# Patient Record
Sex: MALE | Race: WHITE | Employment: FULL TIME | URBAN - METROPOLITAN AREA
[De-identification: names, ages, dates, MRNs, and addresses within clinical notes are randomized per-mention and may not be internally consistent; named-entity substitution may affect disease eponyms.]

---

## 2017-04-11 PROCEDURE — 84153 ASSAY OF PSA TOTAL: CPT | Performed by: FAMILY MEDICINE

## 2017-04-11 PROCEDURE — 88305 TISSUE EXAM BY PATHOLOGIST: CPT | Performed by: FAMILY MEDICINE

## 2018-04-21 PROCEDURE — 82533 TOTAL CORTISOL: CPT | Performed by: FAMILY MEDICINE

## 2018-04-21 PROCEDURE — 84153 ASSAY OF PSA TOTAL: CPT | Performed by: FAMILY MEDICINE

## 2018-04-21 PROCEDURE — 82746 ASSAY OF FOLIC ACID SERUM: CPT | Performed by: FAMILY MEDICINE

## 2018-04-21 PROCEDURE — 82607 VITAMIN B-12: CPT | Performed by: FAMILY MEDICINE

## 2018-04-21 PROCEDURE — 86803 HEPATITIS C AB TEST: CPT | Performed by: FAMILY MEDICINE

## 2018-06-22 PROCEDURE — 88305 TISSUE EXAM BY PATHOLOGIST: CPT | Performed by: INTERNAL MEDICINE

## 2018-11-11 PROBLEM — N52.01 ERECTILE DYSFUNCTION DUE TO ARTERIAL INSUFFICIENCY: Status: ACTIVE | Noted: 2018-11-11

## 2021-04-25 NOTE — ED PROVIDER NOTES
Patient Seen in: THE [facility] In [place]      History   Patient presents with:   Anxiety/Panic attack    Stated Complaint: panic attack    HPI/Subjective:   HPI    63-year-old male who comes in today complaining of a panic attack that oc Latonia Harman MD;  Location: [address] [place] [address]   • [address]  2007    at [facility] through rectum and colostomy - due in 10 years   • LAP, SURG; COLECTOMY, PARTIAL, W/ANASTOMOSIS, W/COLOPROCTOSTOMY, W/COLOSTOMY      eventual reversal   • OTHER SURG and reactive to light. Cardiovascular:      Rate and Rhythm: Normal rate and regular rhythm. Heart sounds: Normal heart sounds. Pulmonary:      Effort: Pulmonary effort is normal. No respiratory distress. Breath sounds: Normal breath sounds. Title    Criteria Score   Age: 41-57 Age Score: 1   History: Slightly Suspicious Hx Score: 0   EKG: Normal EKG Score: 0   HTN: Yes   Hypercholesterolemia: Yes   Atherosclerosis/PVD: No   DM: No   BMI>30kg/m2: Yes   Smoking: No   Family History: No

## 2021-04-25 NOTE — ED INITIAL ASSESSMENT (HPI)
Pt reports feeling anxious all day today with heart pounding/rapid heart beat and shakiness. Pt states he took one 0.5mg xanax about 1.5hrs ago and one 0.5mg about 30 mins ago and is feeling better at this time.   Pt denies CP or SOB and report hx of anxie

## 2025-01-10 ENCOUNTER — HOSPITAL ENCOUNTER (OUTPATIENT)
Dept: CT IMAGING | Age: 60
Discharge: HOME | End: 2025-01-10
Attending: FAMILY MEDICINE

## 2025-01-10 DIAGNOSIS — Z13.6 SCREENING FOR HEART DISEASE: ICD-10-CM

## 2025-01-10 NOTE — PROGRESS NOTES
Date of Service 1/10/2025    SIMONE VALENZUELA  Date of Birth 9/7/1965    Patient Age: 59 year old    PCP: Marck Palafox MD  [address]  [address]  [facility] [address]    Heart Scan Consult  Preliminary Heart Scan Score: 338    Previous Screening  Heart Scan Completed Previously: Yes  Year of last heart scan: 8/4/2021 This was done with [facility].  Score of last heart scan: 288             Risk Factors  Personal Risk Factors  Alterable Risk Factors: High Blood Pressure;Abnormal Cholesterol;Obstructive sleep apnea      Body Mass Index  BMI 32    Blood Pressure  /64 he is on med.  (Normal =< 120/80,  Elevated = 120-129/ >80,  High Stage1 130-139/80-89 , Stage2 >140/>90)    Lipid Profile  1/13/2024  Total - 169  LDL - 94  HDL - 64  Triglycerides - 53  He is on medication.  He see's his PCP at the end of the month and will be having lab work done then, including lipid panel.    Cholesterol Goals  Value   Total  =< 200   HDL  = > 45 Men = > 55 Women   LDL   =< 100   Triglycerides  =< 150       Glucose and Hemoglobin A1C  Lab Results   Component Value Date    GLU 90 11/29/2021    A1C 5.1 04/25/2019     (Normal Fasting Glucose < 100mg/dl )    Nurse Review  Risk factor information and results reviewed with Nurse: Yes    Recommended Follow Up:  Consult your physician regarding:: Final Heart Scan Report;Discuss potential for Incidental Finding      Recommendations for Change:  Nutrition Changes: Low Saturated Fat    Cholesterol Modification (goal of therapy depends upon your risk): No Change Needed    Exercise: Enhance Current Program    Smoking Cessation: No Change Needed    Weight Management: Decrease Current Weight    Stress Management: Adopt Stress Management Techniques    Repeat Heart Scan: 3 Years if Calcium Score is > 0.0;Discuss with your Physician              [facility] Recommended Resources:  Recommended Resources: Upcoming Classes, Medical Services and Health Library [email or web address]             Neetu MONSON RN        Please Contact the Nurse Heart Line with any Questions or Concerns [phone].